# Patient Record
Sex: FEMALE
[De-identification: names, ages, dates, MRNs, and addresses within clinical notes are randomized per-mention and may not be internally consistent; named-entity substitution may affect disease eponyms.]

---

## 2018-01-19 ENCOUNTER — APPOINTMENT (OUTPATIENT)
Dept: ORTHOPEDIC SURGERY | Facility: CLINIC | Age: 63
End: 2018-01-19

## 2018-01-22 ENCOUNTER — APPOINTMENT (OUTPATIENT)
Dept: RHEUMATOLOGY | Facility: CLINIC | Age: 63
End: 2018-01-22

## 2018-02-02 ENCOUNTER — APPOINTMENT (OUTPATIENT)
Dept: ORTHOPEDIC SURGERY | Facility: CLINIC | Age: 63
End: 2018-02-02

## 2019-11-04 ENCOUNTER — EMERGENCY (EMERGENCY)
Facility: HOSPITAL | Age: 64
LOS: 1 days | Discharge: HOME | End: 2019-11-04
Attending: EMERGENCY MEDICINE
Payer: COMMERCIAL

## 2019-11-04 VITALS
RESPIRATION RATE: 18 BRPM | DIASTOLIC BLOOD PRESSURE: 63 MMHG | OXYGEN SATURATION: 100 % | HEART RATE: 82 BPM | SYSTOLIC BLOOD PRESSURE: 137 MMHG

## 2019-11-04 VITALS
HEIGHT: 62 IN | DIASTOLIC BLOOD PRESSURE: 80 MMHG | WEIGHT: 102.07 LBS | HEART RATE: 102 BPM | RESPIRATION RATE: 18 BRPM | OXYGEN SATURATION: 99 % | SYSTOLIC BLOOD PRESSURE: 155 MMHG | TEMPERATURE: 99 F

## 2019-11-04 PROCEDURE — 74018 RADEX ABDOMEN 1 VIEW: CPT

## 2019-11-04 PROCEDURE — 74018 RADEX ABDOMEN 1 VIEW: CPT | Mod: 26

## 2019-11-04 PROCEDURE — 99283 EMERGENCY DEPT VISIT LOW MDM: CPT

## 2019-11-04 RX ORDER — MULTIVIT WITH MIN/MFOLATE/K2 340-15/3 G
1 POWDER (GRAM) ORAL ONCE
Refills: 0 | Status: COMPLETED | OUTPATIENT
Start: 2019-11-04 | End: 2019-11-04

## 2019-11-04 RX ADMIN — Medication 1 BOTTLE: at 03:47

## 2019-11-04 NOTE — ED PROVIDER NOTE - PATIENT PORTAL LINK FT
You can access the FollowMyHealth Patient Portal offered by [facility] by registering at the following website: [email or web address]. By joining [employer]’s FollowMyHealth portal, you will also be able to view your health information using other applications (apps) compatible with our system.

## 2019-11-04 NOTE — ED ADULT NURSE NOTE - CHPI ED NUR SYMPTOMS NEG
Breath sounds clear and equal bilaterally. no nausea/no hematuria/no dysuria/no abdominal distension/no vomiting/no fever/no blood in stool/no burning urination/no chills/no diarrhea

## 2019-11-04 NOTE — ED PROVIDER NOTE - CLINICAL SUMMARY MEDICAL DECISION MAKING FREE TEXT BOX
64F with no significant PMHx who presents with constipation. 64F with no significant PMHx who presents with constipation. no signs of emergent pathology particularly SBO, peritonitis.    Attending MDM: see below for my attestation statement

## 2019-11-04 NOTE — ED PROVIDER NOTE - PROGRESS NOTE DETAILS
XRAY - heavy stool burden, does not appear to have dilated loops - not concerning for SBO, will give mag citrate, patient has prescribed stool softeners at home which she will take and follow up with GI

## 2019-11-04 NOTE — ED PROVIDER NOTE - NSFOLLOWUPINSTRUCTIONS_ED_ALL_ED_FT
Please take your stool softeners at home, and follow up with gastroenterology. If you develop worsening or new symptoms, including nausea, vomiting, abdominal pain, inability to pass gas, please return to the emergency room for evaluation.

## 2019-11-04 NOTE — ED PROVIDER NOTE - NSFOLLOWUPCLINICS_GEN_ALL_ED_FT
[facility]  Gastroenterology  [address] [place] [id number]  Phone: [phone]  Fax:   Follow Up Time:

## 2019-11-04 NOTE — ED PROVIDER NOTE - OBJECTIVE STATEMENT
64F with no significant PMHx who presents with constipation. Patient is concerned that she has an SBO, no prior hx of SBOs. She is usually constipated at baseline, went to [facility] 2 weeks ago - XRAY showed stool burden, given Golytely after which she had a BM. Since then she has not had a bowel movement. She has been on a liquid diet because she is concerned eating solids will make her more constipated. She is passing gas. She denies abdominal pain, n/v, fevers, urinary symptoms. She has never seen a gastroenterologist.

## 2019-11-04 NOTE — ED ADULT NURSE NOTE - OBJECTIVE STATEMENT
65 y/o female history of hypothyroidism presents to the ED from home c/o abdominal pain. Patient states that for the past month she has been having constipation. Patient states that two weeks ago she had gone to urgent care and had been giving Golytely and had her first real BM in 2 weeks. Patient states that she has a very small BM two days ago. Patient denies blood in stool. Denies pain. states that she has been passing gas. States she was concerned for a bowel obstruction.  Denies fever, chills, n/v, weakness, abd pain, diarrhea, numbness/tingling, urinary s/s. Patient A&Ox3, in no respiratory distress, and denies chest pain. Abdomen soft and non distended. Normoactive bowel sounds present.

## 2019-11-04 NOTE — ED PROVIDER NOTE - ATTENDING CONTRIBUTION TO CARE
Pt with constipation, no other sx. benign exam, normal vitals, benign abd. no clinical concern for SBO, diverticulitis, no other apparent emergent process. KUB shows constipation without any other acute process. Pt safe for d/c with supportive care, bowel regimen, gi fu. given return instructions if sx worsen.

## 2021-06-01 ENCOUNTER — TRANSCRIPTION ENCOUNTER (OUTPATIENT)
Age: 66
End: 2021-06-01

## 2024-01-31 ENCOUNTER — APPOINTMENT (OUTPATIENT)
Dept: GASTROENTEROLOGY | Facility: CLINIC | Age: 69
End: 2024-01-31
Payer: COMMERCIAL

## 2024-01-31 VITALS
DIASTOLIC BLOOD PRESSURE: 80 MMHG | OXYGEN SATURATION: 99 % | HEIGHT: 62 IN | WEIGHT: 104 LBS | RESPIRATION RATE: 14 BRPM | SYSTOLIC BLOOD PRESSURE: 140 MMHG | HEART RATE: 80 BPM | TEMPERATURE: 98.4 F | BODY MASS INDEX: 19.14 KG/M2

## 2024-01-31 DIAGNOSIS — Z12.11 ENCOUNTER FOR SCREENING FOR MALIGNANT NEOPLASM OF COLON: ICD-10-CM

## 2024-01-31 DIAGNOSIS — Z86.69 PERSONAL HISTORY OF OTHER DISEASES OF THE NERVOUS SYSTEM AND SENSE ORGANS: ICD-10-CM

## 2024-01-31 DIAGNOSIS — Z86.010 PERSONAL HISTORY OF COLONIC POLYPS: ICD-10-CM

## 2024-01-31 DIAGNOSIS — Z78.9 OTHER SPECIFIED HEALTH STATUS: ICD-10-CM

## 2024-01-31 PROCEDURE — 99204 OFFICE O/P NEW MOD 45 MIN: CPT

## 2024-01-31 RX ORDER — SODIUM SULFATE, POTASSIUM SULFATE AND MAGNESIUM SULFATE 1.6; 3.13; 17.5 G/177ML; G/177ML; G/177ML
17.5-3.13-1.6 SOLUTION ORAL TWICE DAILY
Qty: 2 | Refills: 0 | Status: ACTIVE | COMMUNITY
Start: 2024-01-31 | End: 1900-01-01

## 2024-01-31 NOTE — ASSESSMENT
[FreeTextEntry1] : ASHLEY ENRIQUE was advised to undergo colonoscopy to which she agreed. The procedure will be performed in [facility] with the assistance of an anesthesiologist. The patient was given a Suprep preparation prescription and understood the  procedure as it was explained to her. She was given a booklet distributed by the [employer]  [employer] explaining the procedure in detail and she understood the risks of the procedure not limited to infection, bleeding, perforation or non- diagnosis of colorectal cancer. She was advised that she could not drive home, if she chooses to  receive sedation.  Further diagnostic and treatment recommendations will be based upon the procedure and any biopsies, if they are taken.  Thank you for allowing me to participate in this [facility] care.  , Best personal regards -- Don   I spent 46 minutes with the patient and answered all of her questions.

## 2024-01-31 NOTE — HISTORY OF PRESENT ILLNESS
[FreeTextEntry1] : She is a 69-year-old asymptomatic female referred for a screening colonoscopy.  She has a past history of colon polyps.  Last colonoscopy was in 2019.  She denies a family history of colon cancer

## 2024-01-31 NOTE — CONSULT LETTER
[Dear  ___] : Dear  [unfilled], [Consult Letter:] : I had the pleasure of evaluating your patient, [unfilled]. [( Thank you for referring [unfilled] for consultation for _____ )] : Thank you for referring [unfilled] for consultation for [unfilled] [Please see my note below.] : Please see my note below. [Sincerely,] : Sincerely, [Consult Closing:] : Thank you very much for allowing me to participate in the care of this patient.  If you have any questions, please do not hesitate to contact me. [FreeTextEntry3] : Lloyd Chappell MD  Gastroenterology [facility]